# Patient Record
(demographics unavailable — no encounter records)

---

## 2025-05-14 NOTE — PROCEDURE
[Complicated Symptoms] : complicated symptoms requiring more thorough examination than provided by mirror [None] : none [Flexible Endoscope] : examined with the flexible endoscope [de-identified] : Flexible Laryngoscopy: -BOT and voice box normal -normal larynx -normal cord motion -no pooling or lesions -complete obstruction at the level of the tonsils

## 2025-05-14 NOTE — REVIEW OF SYSTEMS
[Seasonal Allergies] : seasonal allergies [Post Nasal Drip] : post nasal drip [Problem Snoring] : problem snoring [Snoring With Pauses] : snoring with pauses [Shortness Of Breath] : shortness of breath [Cough] : cough [Heartburn] : heartburn [Negative] : Heme/Lymph [FreeTextEntry6] : noisy breathing  [FreeTextEntry7] : reflux   [de-identified] : headache

## 2025-05-14 NOTE — ADDENDUM
[FreeTextEntry1] :  Documented by Frieda Melo acting as scribe for Dr. Borrego on 05/14/2025. All Medical record entries made by the Scribe were at my, Dr. Borrego, direction and personally dictated by me on 05/14/2025 . I have reviewed the chart and agree that the record accurately reflects my personal performance of the history, physical exam, assessment and plan. I have also personally directed, reviewed, and agreed with the discharge instructions.

## 2025-05-14 NOTE — CONSULT LETTER
[Dear  ___] : Dear  [unfilled], [Consult Letter:] : I had the pleasure of evaluating your patient, [unfilled]. [Please see my note below.] : Please see my note below. [Consult Closing:] : Thank you very much for allowing me to participate in the care of this patient.  If you have any questions, please do not hesitate to contact me. [Sincerely,] : Sincerely, [FreeTextEntry3] :  Curt Borrego MD FACS

## 2025-05-14 NOTE — HISTORY OF PRESENT ILLNESS
[de-identified] : Patient presents today referred by Dr. Mendez for his thyroid. Patient states he just had a thyroid ultrasound done at Dr. Mendez office but the report hasnt been read yet by the radiologist. Patient states he has been having a lot of issues with his throat. States waking up in the middle of the night with really bad heart burn and feeling like he has saliva stuck in the back of his throat. States he feels a lump of saliva in the left side of his throat that comes and goes. States his girlfriend has noticed a difference in his breath. Patient states he has suddenly become a really bad snorer and is breath holding at night. States gaining about 30 pounds of weight over the past 2 years. Patient states he takes TUMS for his heartburn.

## 2025-05-14 NOTE — ASSESSMENT
[FreeTextEntry1] :  Reviewed and reconciled medications, allergies, PMHx, PSHx, SocHx, FMHx.  Patient presents today referred by Dr. Mendez for his thyroid. Patient states he just had a thyroid ultrasound done at Dr. Mendez office but the report hasnt been read yet by the radiologist. Patient states he has been having a lot of issues with his throat. States waking up in the middle of the night with really bad heart burn and feeling like he has saliva stuck in the back of his throat. States he feels a lump of saliva in the left side of his throat that comes and goes. States his girlfriend has noticed a difference in his breath. Patient states he has suddenly become a really bad snorer and is breath holding. States gaining about 30 pounds of weight over the past 2 year. Patient states he takes TUMS for his heartburn.   Physical exam: -right ear: cerumen impaction removed via curettage  -left ear: cerumen removed via curettage  -mildly deviated septum R>L.  -Inflamed turbinates b/l -huge tonsils -neck measures 16.5  Flexible Laryngoscopy: -BOT and voice box normal -normal larynx -normal cord motion -no pooling or lesions -complete obstruction at the level of the tonsils  Plan: Once you get the results back from the thyroid US, send them over to us. Sleep study ordered. Start Dymista - 1 spray bilaterally BID, spray laterally. FU with results

## 2025-05-14 NOTE — PHYSICAL EXAM
[de-identified] : neck measures 16.5 [de-identified] : dont feel anything  [Hearing Shaffer Test (Tuning Fork On Forehead)] : no lateralization of tone [FreeTextEntry8] : cerumen impaction removed via curettage  [FreeTextEntry9] : cerumen removed via curettage  [de-identified] : mildly deviated septum R>L. Inflamed turbinates b/l [Midline] : trachea located in midline position [de-identified] : huge tonsils  [Normal] : no rashes [FreeTextEntry2] : sensations intact. sinuses nontender to percussion

## 2025-07-09 NOTE — HISTORY OF PRESENT ILLNESS
[de-identified] : Patient with h/o EDILIA, loud snoring, tonsillar hypertrophy, multiple thyroid nodules, and chronic rhinitis presents today to discuss results from sleep study.

## 2025-07-09 NOTE — ASSESSMENT
[FreeTextEntry1] : Reviewed and reconciled medications, allergies, PMHx, PSHx, SocHx, FMHx.  Patient with h/o EDILIA, loud snoring, tonsillar hypertrophy, multiple thyroid nodules, and chronic rhinitis presents today to discuss results from sleep study.  Sleep study 6/26/25: -AHI 76.4 -oxygen dropped down to 68%  Plan: Sleep study reviewed and discussed with the patient. -Ordered CPAP machine with nasal pillows ASAP -Greater than 50% of the interaction spent discussing results and treatment -FU 1 month after starting CPAP machine

## 2025-07-09 NOTE — ADDENDUM
[FreeTextEntry1] :  Documented by Thomas Marti acting as scribe for Dr. Borrego on 07/09/2025. All Medical record entries made by the Scribe were at my, Dr. Borrego, direction and personally dictated by me on 07/09/2025 . I have reviewed the chart and agree that the record accurately reflects my personal performance of the history, physical exam, assessment and plan. I have also personally directed, reviewed, and agreed with the discharge instructions.

## 2025-07-09 NOTE — CONSULT LETTER
[Dear  ___] : Dear  [unfilled], [Courtesy Letter:] : I had the pleasure of seeing your patient, [unfilled], in my office today. [Please see my note below.] : Please see my note below. [Consult Closing:] : Thank you very much for allowing me to participate in the care of this patient.  If you have any questions, please do not hesitate to contact me. [Sincerely,] : Sincerely, [FreeTextEntry3] :  Curt Borrego MD FACS